# Patient Record
Sex: FEMALE | Race: WHITE | Employment: FULL TIME | URBAN - METROPOLITAN AREA
[De-identification: names, ages, dates, MRNs, and addresses within clinical notes are randomized per-mention and may not be internally consistent; named-entity substitution may affect disease eponyms.]

---

## 2024-02-10 ENCOUNTER — APPOINTMENT (OUTPATIENT)
Dept: GENERAL RADIOLOGY | Age: 24
End: 2024-02-10
Payer: COMMERCIAL

## 2024-02-10 ENCOUNTER — HOSPITAL ENCOUNTER (EMERGENCY)
Age: 24
Discharge: HOME | End: 2024-02-10
Payer: COMMERCIAL

## 2024-02-10 VITALS
OXYGEN SATURATION: 100 % | SYSTOLIC BLOOD PRESSURE: 129 MMHG | TEMPERATURE: 97.2 F | BODY MASS INDEX: 26.26 KG/M2 | HEART RATE: 76 BPM | HEIGHT: 61 IN | DIASTOLIC BLOOD PRESSURE: 88 MMHG | WEIGHT: 139.11 LBS | RESPIRATION RATE: 18 BRPM

## 2024-02-10 DIAGNOSIS — S82.851A FRACTURE OF ANKLE, TRIMALLEOLAR, CLOSED, RIGHT, INITIAL ENCOUNTER: Primary | ICD-10-CM

## 2024-02-10 PROCEDURE — 29515 APPLICATION SHORT LEG SPLINT: CPT

## 2024-02-10 PROCEDURE — 73610 X-RAY EXAM OF ANKLE: CPT

## 2024-02-10 PROCEDURE — 6370000000 HC RX 637 (ALT 250 FOR IP): Performed by: PHYSICIAN ASSISTANT

## 2024-02-10 PROCEDURE — 99283 EMERGENCY DEPT VISIT LOW MDM: CPT

## 2024-02-10 RX ORDER — HYDROCODONE BITARTRATE AND ACETAMINOPHEN 5; 325 MG/1; MG/1
1 TABLET ORAL EVERY 6 HOURS PRN
Qty: 12 TABLET | Refills: 0 | Status: SHIPPED | OUTPATIENT
Start: 2024-02-10 | End: 2024-02-13

## 2024-02-10 RX ORDER — HYDROCODONE BITARTRATE AND ACETAMINOPHEN 5; 325 MG/1; MG/1
1 TABLET ORAL ONCE
Status: COMPLETED | OUTPATIENT
Start: 2024-02-10 | End: 2024-02-10

## 2024-02-10 RX ADMIN — IBUPROFEN 600 MG: 200 TABLET, FILM COATED ORAL at 17:57

## 2024-02-10 RX ADMIN — HYDROCODONE BITARTRATE AND ACETAMINOPHEN 1 TABLET: 5; 325 TABLET ORAL at 18:39

## 2024-02-10 ASSESSMENT — PAIN DESCRIPTION - LOCATION
LOCATION: ANKLE
LOCATION: ANKLE

## 2024-02-10 ASSESSMENT — PAIN SCALES - GENERAL
PAINLEVEL_OUTOF10: 10
PAINLEVEL_OUTOF10: 8
PAINLEVEL_OUTOF10: 8
PAINLEVEL_OUTOF10: 9

## 2024-02-10 ASSESSMENT — LIFESTYLE VARIABLES
HOW MANY STANDARD DRINKS CONTAINING ALCOHOL DO YOU HAVE ON A TYPICAL DAY: PATIENT DOES NOT DRINK
HOW OFTEN DO YOU HAVE A DRINK CONTAINING ALCOHOL: NEVER

## 2024-02-10 ASSESSMENT — PAIN DESCRIPTION - DESCRIPTORS: DESCRIPTORS: THROBBING

## 2024-02-10 ASSESSMENT — PAIN DESCRIPTION - ORIENTATION
ORIENTATION: RIGHT
ORIENTATION: RIGHT

## 2024-02-10 ASSESSMENT — PAIN - FUNCTIONAL ASSESSMENT: PAIN_FUNCTIONAL_ASSESSMENT: 0-10

## 2024-02-10 ASSESSMENT — PAIN DESCRIPTION - PAIN TYPE: TYPE: ACUTE PAIN

## 2024-02-10 NOTE — ED PROVIDER NOTES
**ADVANCED PRACTICE PROVIDER, I HAVE EVALUATED THIS PATIENT**        [facility] EMERGENCY DEPARTMENT  EMERGENCY DEPARTMENT ENCOUNTER      Pt Name: Anne Gillette  MRN:[id number]  Birthdate 2000  Date of evaluation: 2/10/2024  Provider: Niall Gee PA-C  Note Started: 6:36 PM EST 2/10/24        Chief Complaint:    Chief Complaint   Patient presents with    Ankle Pain     Patient was skating at sister's party and fell, twisting and landing on her ankle.          Nursing Notes, Past Medical Hx, Past Surgical Hx, Social Hx, Allergies, and Family Hx were all reviewed and agreed with or any disagreements were addressed in the HPI.    HPI: (Location, Duration, Timing, Severity, Quality, Assoc Sx, Context, Modifying factors)    History From: Patient acute          Chief Complaint of right ankle injury    This is a  23 y.o. female who presents indicating that within the last couple of hours or so before coming in she was skating at a party and had an accidental fall twisting and landing on the right ankle.  She states that it hurts a lot, clicks and pops when she moves it, and is swollen.  No medication taken for this so far.  No radiating pain.  No previous history of fracture to this area according to patient.    PastMedical/Surgical History:  No past medical history on file.  No past surgical history on file.    Medications:  Previous Medications    No medications on file       Review of Systems:  (1 systems needed)  Review of Systems  Positive history as above, no head contusion headache or vision change shortness of breath or chest pain dizziness confusion syncope or near syncope nausea or vomiting.  No knee pain or back pain per usual.  \"Positives and Pertinent negatives as per HPI\"    Physical Exam:  Physical Exam  Vitals and nursing note reviewed.   Constitutional:       Appearance: Normal appearance. She is not ill-appearing, toxic-appearing or diaphoretic.   HENT:      Head: Normocephalic  and atraumatic.      Right Ear: External ear normal.      Left Ear: External ear normal.      Nose: Nose normal.      Mouth/Throat:      Mouth: Mucous membranes are moist.      Comments: Gag reflex intact  Eyes:      General:         Right eye: No discharge.         Left eye: No discharge.      Conjunctiva/sclera: Conjunctivae normal.   Cardiovascular:      Rate and Rhythm: Normal rate.      Pulses: Normal pulses.           Dorsalis pedis pulses are 2+ on the right side and 2+ on the left side.   Pulmonary:      Effort: Pulmonary effort is normal. No respiratory distress.   Musculoskeletal:         General: Swelling and signs of injury present.      Cervical back: Normal range of motion and neck supple. No rigidity or tenderness.        Feet:    Skin:     General: Skin is warm and dry.      Capillary Refill: Capillary refill takes less than 2 seconds.   Neurological:      Mental Status: She is alert and oriented to person, place, and time. Mental status is at baseline.   Psychiatric:         Mood and Affect: Mood normal.         Behavior: Behavior normal.         MEDICAL DECISION MAKING    Vitals:    Vitals:    02/10/24 1542 02/10/24 1837   BP: 129/88    Pulse: 76    Resp: 19 18   Temp: 97.2 °F (36.2 °C)    TempSrc: Oral    SpO2: 100%    Weight: 63.1 kg (139 lb 1.8 oz)    Height: 1.549 m (5' 1\")        LABS:Labs Reviewed - No data to display     Remainder of labs reviewed and were negative at this time or not returned at the time of this note.    RADIOLOGY:   Non-plain film images such as CT, Ultrasound and MRI are read by the radiologist. Niall BOWSER PA-C have directly visualized the radiologic plain film image(s) with the below findings:    3 view right ankle x-ray shows trimalleolar acute fracture with no significant mortise shift or splaying of distal tibia and fibula as interpreted by myself in absence of radiologist.    Interpretation per the Radiologist below, if available at the time of this

## 2024-02-10 NOTE — DISCHARGE INSTRUCTIONS
Home in stable condition to keep the splint clean dry and in place, use over-the-counter medication such as ibuprofen per box directions and then also the prescribed medication for pain as needed, no weightbearing on the right lower extremity, use crutches, ice the area 15 to 20 minutes a few times daily elevate and rest it.  Follow-up with orthopedics on Monday but given the call for further care and treatment.  Return to the ER for any emergency worsening or concern.

## 2024-02-10 NOTE — ED TRIAGE NOTES
Patient was skating at sister's party and fell, twisting and landing on her ankle.  She states it is throbbing and she can feel a popping sensation when she moves it.

## 2024-02-10 NOTE — ED NOTES
.Pt discharged at this time. Discharge instructions and medications reviewed,  Questions were answered. PT verbalized understanding.   Follow up appointments were discussed.

## 2024-02-12 ENCOUNTER — TELEPHONE (OUTPATIENT)
Dept: ORTHOPEDIC SURGERY | Age: 24
End: 2024-02-12

## 2024-02-12 NOTE — TELEPHONE ENCOUNTER
Called and spoke with patient's father Uriel. Explained that patient needs to see Dr. Salinas the foot and ankle surgeon. I will forward the message to the [facility] so Dr. Salinas's staff can work patient into the schedule.

## 2024-02-12 NOTE — TELEPHONE ENCOUNTER
Patient was scheduled for 2/14/2024 at the [facility], [address] [place] [address]., [address], [place] [address].

## 2024-02-12 NOTE — TELEPHONE ENCOUNTER
General Question     Subject: patient is calling to see if she can see Dr. Chao for her Fracture Rt Ankle she would be a new patient was seen in the er over the Weekend  xr done at [facility] she just need a call back.   Patient Anne Gillette  Contact Number: [phone]

## 2024-02-14 ENCOUNTER — ANESTHESIA EVENT (OUTPATIENT)
Dept: OPERATING ROOM | Age: 24
End: 2024-02-14
Payer: COMMERCIAL

## 2024-02-14 ENCOUNTER — TELEPHONE (OUTPATIENT)
Dept: ORTHOPEDIC SURGERY | Age: 24
End: 2024-02-14

## 2024-02-14 ENCOUNTER — APPOINTMENT (OUTPATIENT)
Dept: GENERAL RADIOLOGY | Age: 24
End: 2024-02-14
Attending: ORTHOPAEDIC SURGERY
Payer: COMMERCIAL

## 2024-02-14 ENCOUNTER — ANESTHESIA (OUTPATIENT)
Dept: OPERATING ROOM | Age: 24
End: 2024-02-14
Payer: COMMERCIAL

## 2024-02-14 ENCOUNTER — OFFICE VISIT (OUTPATIENT)
Dept: ORTHOPEDIC SURGERY | Age: 24
End: 2024-02-14
Payer: COMMERCIAL

## 2024-02-14 ENCOUNTER — HOSPITAL ENCOUNTER (OUTPATIENT)
Age: 24
Setting detail: OUTPATIENT SURGERY
Discharge: HOME | End: 2024-02-14
Attending: ORTHOPAEDIC SURGERY | Admitting: ORTHOPAEDIC SURGERY
Payer: COMMERCIAL

## 2024-02-14 VITALS
OXYGEN SATURATION: 93 % | TEMPERATURE: 97.2 F | BODY MASS INDEX: 26.43 KG/M2 | HEIGHT: 61 IN | DIASTOLIC BLOOD PRESSURE: 81 MMHG | SYSTOLIC BLOOD PRESSURE: 117 MMHG | WEIGHT: 140 LBS | HEART RATE: 115 BPM | RESPIRATION RATE: 19 BRPM

## 2024-02-14 VITALS — BODY MASS INDEX: 26.24 KG/M2 | WEIGHT: 139 LBS | HEIGHT: 61 IN

## 2024-02-14 DIAGNOSIS — S82.851A CLOSED TRIMALLEOLAR FRACTURE OF RIGHT ANKLE, INITIAL ENCOUNTER: Primary | ICD-10-CM

## 2024-02-14 PROBLEM — S93.431A SYNDESMOTIC DISRUPTION OF RIGHT ANKLE: Status: ACTIVE | Noted: 2024-02-14

## 2024-02-14 LAB — HCG UR QL: NEGATIVE

## 2024-02-14 PROCEDURE — 7100000001 HC PACU RECOVERY - ADDTL 15 MIN: Performed by: ORTHOPAEDIC SURGERY

## 2024-02-14 PROCEDURE — 99204 OFFICE O/P NEW MOD 45 MIN: CPT | Performed by: ORTHOPAEDIC SURGERY

## 2024-02-14 PROCEDURE — 7100000010 HC PHASE II RECOVERY - FIRST 15 MIN: Performed by: ORTHOPAEDIC SURGERY

## 2024-02-14 PROCEDURE — 2709999900 HC NON-CHARGEABLE SUPPLY: Performed by: ORTHOPAEDIC SURGERY

## 2024-02-14 PROCEDURE — G8484 FLU IMMUNIZE NO ADMIN: HCPCS | Performed by: ORTHOPAEDIC SURGERY

## 2024-02-14 PROCEDURE — 3700000001 HC ADD 15 MINUTES (ANESTHESIA): Performed by: ORTHOPAEDIC SURGERY

## 2024-02-14 PROCEDURE — 2580000003 HC RX 258

## 2024-02-14 PROCEDURE — 2720000010 HC SURG SUPPLY STERILE: Performed by: ORTHOPAEDIC SURGERY

## 2024-02-14 PROCEDURE — 6360000002 HC RX W HCPCS: Performed by: ORTHOPAEDIC SURGERY

## 2024-02-14 PROCEDURE — A4217 STERILE WATER/SALINE, 500 ML: HCPCS | Performed by: ORTHOPAEDIC SURGERY

## 2024-02-14 PROCEDURE — 84703 CHORIONIC GONADOTROPIN ASSAY: CPT

## 2024-02-14 PROCEDURE — 2500000003 HC RX 250 WO HCPCS

## 2024-02-14 PROCEDURE — 2580000003 HC RX 258: Performed by: ORTHOPAEDIC SURGERY

## 2024-02-14 PROCEDURE — 7100000011 HC PHASE II RECOVERY - ADDTL 15 MIN: Performed by: ORTHOPAEDIC SURGERY

## 2024-02-14 PROCEDURE — G8427 DOCREV CUR MEDS BY ELIG CLIN: HCPCS | Performed by: ORTHOPAEDIC SURGERY

## 2024-02-14 PROCEDURE — 2580000003 HC RX 258: Performed by: STUDENT IN AN ORGANIZED HEALTH CARE EDUCATION/TRAINING PROGRAM

## 2024-02-14 PROCEDURE — 73600 X-RAY EXAM OF ANKLE: CPT

## 2024-02-14 PROCEDURE — 6370000000 HC RX 637 (ALT 250 FOR IP): Performed by: STUDENT IN AN ORGANIZED HEALTH CARE EDUCATION/TRAINING PROGRAM

## 2024-02-14 PROCEDURE — G8419 CALC BMI OUT NRM PARAM NOF/U: HCPCS | Performed by: ORTHOPAEDIC SURGERY

## 2024-02-14 PROCEDURE — 6360000002 HC RX W HCPCS

## 2024-02-14 PROCEDURE — 1036F TOBACCO NON-USER: CPT | Performed by: ORTHOPAEDIC SURGERY

## 2024-02-14 PROCEDURE — 3700000000 HC ANESTHESIA ATTENDED CARE: Performed by: ORTHOPAEDIC SURGERY

## 2024-02-14 PROCEDURE — C1713 ANCHOR/SCREW BN/BN,TIS/BN: HCPCS | Performed by: ORTHOPAEDIC SURGERY

## 2024-02-14 PROCEDURE — 3600000005 HC SURGERY LEVEL 5 BASE: Performed by: ORTHOPAEDIC SURGERY

## 2024-02-14 PROCEDURE — 7100000000 HC PACU RECOVERY - FIRST 15 MIN: Performed by: ORTHOPAEDIC SURGERY

## 2024-02-14 PROCEDURE — 3600000015 HC SURGERY LEVEL 5 ADDTL 15MIN: Performed by: ORTHOPAEDIC SURGERY

## 2024-02-14 DEVICE — SCREW BNE L18MM DIA2.7MM HEX HD DIA2.5MM CANC BIODUR 108C: Type: IMPLANTABLE DEVICE | Site: ANKLE | Status: FUNCTIONAL

## 2024-02-14 DEVICE — SCREW BNE L16MM DIA3.5MM HD DIA2.7MM PERIARTC CORT S STL ST: Type: IMPLANTABLE DEVICE | Site: ANKLE | Status: FUNCTIONAL

## 2024-02-14 DEVICE — SCREW BNE L20MM DIA2.7MM HEX HD DIA2.5MM CANC BIODUR 108C: Type: IMPLANTABLE DEVICE | Site: ANKLE | Status: FUNCTIONAL

## 2024-02-14 DEVICE — PLATE BNE L80MM 4 H R LAT DST PERIARTC FIBULAR S STL LOK: Type: IMPLANTABLE DEVICE | Site: ANKLE | Status: FUNCTIONAL

## 2024-02-14 DEVICE — SCREW BNE L14MM DIA2.7MM HEX HD DIA2.5MM CANC BIODUR 108C: Type: IMPLANTABLE DEVICE | Site: ANKLE | Status: FUNCTIONAL

## 2024-02-14 DEVICE — SCREW BNE L48MM DIA3.5MM CORT PERIARTC S STL ST: Type: IMPLANTABLE DEVICE | Site: ANKLE | Status: FUNCTIONAL

## 2024-02-14 DEVICE — SCREW BNE L14MM DIA3.5MM HD DIA2.7MM CORT PERIARTC S STL ST: Type: IMPLANTABLE DEVICE | Site: ANKLE | Status: FUNCTIONAL

## 2024-02-14 DEVICE — SCREW BNE L16MM DIA2.7MM LNG CORT FT ANK S STL ST: Type: IMPLANTABLE DEVICE | Site: ANKLE | Status: FUNCTIONAL

## 2024-02-14 RX ORDER — MEPERIDINE HYDROCHLORIDE 25 MG/ML
12.5 INJECTION INTRAMUSCULAR; INTRAVENOUS; SUBCUTANEOUS EVERY 5 MIN PRN
Status: DISCONTINUED | OUTPATIENT
Start: 2024-02-14 | End: 2024-02-14 | Stop reason: HOSPADM

## 2024-02-14 RX ORDER — ONDANSETRON 2 MG/ML
INJECTION INTRAMUSCULAR; INTRAVENOUS PRN
Status: DISCONTINUED | OUTPATIENT
Start: 2024-02-14 | End: 2024-02-14 | Stop reason: SDUPTHER

## 2024-02-14 RX ORDER — LIDOCAINE HYDROCHLORIDE 20 MG/ML
INJECTION, SOLUTION EPIDURAL; INFILTRATION; INTRACAUDAL; PERINEURAL PRN
Status: DISCONTINUED | OUTPATIENT
Start: 2024-02-14 | End: 2024-02-14 | Stop reason: SDUPTHER

## 2024-02-14 RX ORDER — BUPIVACAINE HYDROCHLORIDE 5 MG/ML
INJECTION, SOLUTION EPIDURAL; INTRACAUDAL
Status: COMPLETED | OUTPATIENT
Start: 2024-02-14 | End: 2024-02-14

## 2024-02-14 RX ORDER — SODIUM CHLORIDE 0.9 % (FLUSH) 0.9 %
5-40 SYRINGE (ML) INJECTION EVERY 12 HOURS SCHEDULED
Status: DISCONTINUED | OUTPATIENT
Start: 2024-02-14 | End: 2024-02-14 | Stop reason: HOSPADM

## 2024-02-14 RX ORDER — ONDANSETRON 2 MG/ML
4 INJECTION INTRAMUSCULAR; INTRAVENOUS
Status: DISCONTINUED | OUTPATIENT
Start: 2024-02-14 | End: 2024-02-14 | Stop reason: HOSPADM

## 2024-02-14 RX ORDER — CEPHALEXIN 500 MG/1
500 CAPSULE ORAL 4 TIMES DAILY
Qty: 20 CAPSULE | Refills: 0 | Status: SHIPPED | OUTPATIENT
Start: 2024-02-14 | End: 2024-02-19

## 2024-02-14 RX ORDER — LORAZEPAM 2 MG/ML
1 INJECTION INTRAMUSCULAR
Status: DISCONTINUED | OUTPATIENT
Start: 2024-02-14 | End: 2024-02-14 | Stop reason: HOSPADM

## 2024-02-14 RX ORDER — IPRATROPIUM BROMIDE AND ALBUTEROL SULFATE 2.5; .5 MG/3ML; MG/3ML
1 SOLUTION RESPIRATORY (INHALATION)
Status: DISCONTINUED | OUTPATIENT
Start: 2024-02-14 | End: 2024-02-14 | Stop reason: HOSPADM

## 2024-02-14 RX ORDER — SODIUM CHLORIDE 9 MG/ML
INJECTION, SOLUTION INTRAVENOUS PRN
Status: DISCONTINUED | OUTPATIENT
Start: 2024-02-14 | End: 2024-02-14 | Stop reason: HOSPADM

## 2024-02-14 RX ORDER — DEXAMETHASONE SODIUM PHOSPHATE 4 MG/ML
INJECTION, SOLUTION INTRA-ARTICULAR; INTRALESIONAL; INTRAMUSCULAR; INTRAVENOUS; SOFT TISSUE PRN
Status: DISCONTINUED | OUTPATIENT
Start: 2024-02-14 | End: 2024-02-14 | Stop reason: SDUPTHER

## 2024-02-14 RX ORDER — MIDAZOLAM HYDROCHLORIDE 1 MG/ML
INJECTION INTRAMUSCULAR; INTRAVENOUS PRN
Status: DISCONTINUED | OUTPATIENT
Start: 2024-02-14 | End: 2024-02-14 | Stop reason: SDUPTHER

## 2024-02-14 RX ORDER — SCOLOPAMINE TRANSDERMAL SYSTEM 1 MG/1
1 PATCH, EXTENDED RELEASE TRANSDERMAL
Status: DISCONTINUED | OUTPATIENT
Start: 2024-02-14 | End: 2024-02-14 | Stop reason: HOSPADM

## 2024-02-14 RX ORDER — METHOCARBAMOL 100 MG/ML
INJECTION, SOLUTION INTRAMUSCULAR; INTRAVENOUS PRN
Status: DISCONTINUED | OUTPATIENT
Start: 2024-02-14 | End: 2024-02-14 | Stop reason: SDUPTHER

## 2024-02-14 RX ORDER — DEXMEDETOMIDINE HYDROCHLORIDE 100 UG/ML
INJECTION, SOLUTION INTRAVENOUS PRN
Status: DISCONTINUED | OUTPATIENT
Start: 2024-02-14 | End: 2024-02-14 | Stop reason: SDUPTHER

## 2024-02-14 RX ORDER — FENTANYL CITRATE 0.05 MG/ML
25 INJECTION, SOLUTION INTRAMUSCULAR; INTRAVENOUS EVERY 5 MIN PRN
Status: DISCONTINUED | OUTPATIENT
Start: 2024-02-14 | End: 2024-02-14 | Stop reason: HOSPADM

## 2024-02-14 RX ORDER — PROCHLORPERAZINE EDISYLATE 5 MG/ML
5 INJECTION INTRAMUSCULAR; INTRAVENOUS
Status: DISCONTINUED | OUTPATIENT
Start: 2024-02-14 | End: 2024-02-14 | Stop reason: HOSPADM

## 2024-02-14 RX ORDER — PROPOFOL 10 MG/ML
INJECTION, EMULSION INTRAVENOUS PRN
Status: DISCONTINUED | OUTPATIENT
Start: 2024-02-14 | End: 2024-02-14 | Stop reason: SDUPTHER

## 2024-02-14 RX ORDER — SODIUM CHLORIDE 0.9 % (FLUSH) 0.9 %
5-40 SYRINGE (ML) INJECTION PRN
Status: DISCONTINUED | OUTPATIENT
Start: 2024-02-14 | End: 2024-02-14 | Stop reason: HOSPADM

## 2024-02-14 RX ORDER — FENTANYL CITRATE 0.05 MG/ML
50 INJECTION, SOLUTION INTRAMUSCULAR; INTRAVENOUS EVERY 5 MIN PRN
Status: DISCONTINUED | OUTPATIENT
Start: 2024-02-14 | End: 2024-02-14 | Stop reason: HOSPADM

## 2024-02-14 RX ORDER — OXYCODONE HYDROCHLORIDE 5 MG/1
5 TABLET ORAL PRN
Status: DISCONTINUED | OUTPATIENT
Start: 2024-02-14 | End: 2024-02-14 | Stop reason: HOSPADM

## 2024-02-14 RX ORDER — FENTANYL CITRATE 50 UG/ML
INJECTION, SOLUTION INTRAMUSCULAR; INTRAVENOUS PRN
Status: DISCONTINUED | OUTPATIENT
Start: 2024-02-14 | End: 2024-02-14 | Stop reason: SDUPTHER

## 2024-02-14 RX ORDER — SODIUM CHLORIDE 9 MG/ML
INJECTION, SOLUTION INTRAVENOUS CONTINUOUS PRN
Status: DISCONTINUED | OUTPATIENT
Start: 2024-02-14 | End: 2024-02-14 | Stop reason: SDUPTHER

## 2024-02-14 RX ADMIN — DEXAMETHASONE SODIUM PHOSPHATE 8 MG: 4 INJECTION, SOLUTION INTRAMUSCULAR; INTRAVENOUS at 12:14

## 2024-02-14 RX ADMIN — HYDROMORPHONE HYDROCHLORIDE 0.5 MG: 1 INJECTION, SOLUTION INTRAMUSCULAR; INTRAVENOUS; SUBCUTANEOUS at 12:27

## 2024-02-14 RX ADMIN — SODIUM CHLORIDE: 9 INJECTION, SOLUTION INTRAVENOUS at 10:32

## 2024-02-14 RX ADMIN — DEXMEDETOMIDINE 6 MCG: 100 INJECTION, SOLUTION INTRAVENOUS at 12:17

## 2024-02-14 RX ADMIN — HYDROMORPHONE HYDROCHLORIDE 0.5 MG: 1 INJECTION, SOLUTION INTRAMUSCULAR; INTRAVENOUS; SUBCUTANEOUS at 12:33

## 2024-02-14 RX ADMIN — DEXMEDETOMIDINE 6 MCG: 100 INJECTION, SOLUTION INTRAVENOUS at 12:22

## 2024-02-14 RX ADMIN — SODIUM CHLORIDE 2000 MG: 900 INJECTION INTRAVENOUS at 12:14

## 2024-02-14 RX ADMIN — ONDANSETRON 4 MG: 2 INJECTION INTRAMUSCULAR; INTRAVENOUS at 12:19

## 2024-02-14 RX ADMIN — DEXMEDETOMIDINE 8 MCG: 100 INJECTION, SOLUTION INTRAVENOUS at 12:25

## 2024-02-14 RX ADMIN — MIDAZOLAM 1 MG: 1 INJECTION INTRAMUSCULAR; INTRAVENOUS at 12:05

## 2024-02-14 RX ADMIN — HYDROMORPHONE HYDROCHLORIDE 0.5 MG: 1 INJECTION, SOLUTION INTRAMUSCULAR; INTRAVENOUS; SUBCUTANEOUS at 12:22

## 2024-02-14 RX ADMIN — METHOCARBAMOL 500 MG: 100 INJECTION INTRAMUSCULAR; INTRAVENOUS at 12:24

## 2024-02-14 RX ADMIN — LIDOCAINE HYDROCHLORIDE 60 MG: 20 INJECTION, SOLUTION EPIDURAL; INFILTRATION; INTRACAUDAL; PERINEURAL at 12:08

## 2024-02-14 RX ADMIN — SODIUM CHLORIDE: 9 INJECTION, SOLUTION INTRAVENOUS at 12:03

## 2024-02-14 RX ADMIN — PROPOFOL 200 MG: 10 INJECTION, EMULSION INTRAVENOUS at 12:09

## 2024-02-14 RX ADMIN — FENTANYL CITRATE 100 MCG: 50 INJECTION INTRAMUSCULAR; INTRAVENOUS at 12:07

## 2024-02-14 NOTE — BRIEF OP NOTE
Brief Postoperative Note      Patient: Anne Gillette  YOB: 2000  MRN: [id number]    Date of Procedure: 2/14/2024    Pre-Op Diagnosis Codes:     * Closed fracture of right ankle, initial encounter [S82.891A]    Post-Op Diagnosis: Same       Procedure(s):  OPEN REDUCTION INTERNAL FIXATION RIGHT ANKLE TRIMALLEOLAR FRACTURE WITH SYNDESMOSIS REPAIR.    Surgeon(s):  Alan Salinas MD    Assistant: VALERIE Garcia  Surgical Assistant: Melvin Paige    Anesthesia: General    Estimated Blood Loss (mL): Minimal    Complications: None    Specimens:   * No specimens in log *    Implants:  Implant Name Type Inv. Item Serial No.  Lot No. LRB No. Used Action   PLATE BNE L80MM 4 H R LAT DST PERIARTC FIBULAR S STL PAU - [id number]  PLATE BNE L80MM 4 H R LAT DST PERIARTC FIBULAR S STL PAU  [employer]WD  Right 1 Implanted   SCREW BNE L16MM DIA2.7MM LNG DELISA FT ANK S STL ST - [id number]  SCREW BNE L16MM DIA2.7MM LNG DELISA FT ANK S STL ST  [employer]WD  Right 1 Implanted   SCREW BNE L16MM DIA3.5MM HD DIA2.7MM PERIARTC DELISA S STL ST - [id number]  SCREW BNE L16MM DIA3.5MM HD DIA2.7MM PERIARTC DELISA S STL ST  [employer]WD  Right 1 Implanted   SCREW BNE L18MM DIA2.7MM HEX HD DIA2.5MM CANC BIODUR 108C - [id number]  SCREW BNE L18MM DIA2.7MM HEX HD DIA2.5MM CANC BIODUR 108C  [employer]WD  Right 3 Implanted   SCREW BNE L16MM DIA2.7MM HEX HD DIA2.5MM CANC BIODUR 108C - [id number]  SCREW BNE L16MM DIA2.7MM HEX HD DIA2.5MM CANC BIODUR 108C  [employer]WD  Right 3 Implanted   SCREW BNE L14MM DIA2.7MM HEX HD DIA2.5MM CANC BIODUR 108C - [id number]  SCREW BNE L14MM DIA2.7MM HEX HD DIA2.5MM CANC BIODUR 108C  [employer]WD  Right 1 Implanted   SCREW BNE L14MM DIA3.5MM HD DIA2.7MM DELISA PERIARTC S STL ST - [id number]  SCREW BNE L14MM DIA3.5MM HD DIA2.7MM DELISA PERIARTC S STL ST  [employer]-WD  Right 1 Implanted   SCREW BNE L48MM DIA3.5MM DELISA PERIARTC S STL ST -

## 2024-02-14 NOTE — TELEPHONE ENCOUNTER
Auth: NPR  Date: 02/14/24  Reference # None  Spoke with: Online  Type of SX: Outpatient  Location: [facility]  CPT: 72491   DX: S82.851A  SX area: Rt ankle  Insurance: [facility]

## 2024-02-14 NOTE — PROGRESS NOTES
Pt is alert and oriented and denies pain at this time, vital signs stable on room air. Discharged instructions given to Pt and dad, all questions answered. Pt discharged to home with dad to transport.

## 2024-02-14 NOTE — H&P
Preoperative H&P Update    The patient's History and Physical in the medical record from 2/14/2024 was reviewed by me today.    No past medical history on file.  No past surgical history on file.  No current facility-administered medications on file prior to encounter.     Current Outpatient Medications on File Prior to Encounter   Medication Sig Dispense Refill    cephALEXin (KEFLEX) 500 MG capsule Take 1 capsule by mouth 4 times daily for 5 days (Patient not taking: Reported on 2/14/2024) 20 capsule 0       Allergies   Allergen Reactions    Bactrim [Sulfamethoxazole-Trimethoprim] Hives      I reviewed the HPI, medications, allergies, reason for surgery, diagnosis and treatment plan and there has been no change.    The patient was evaluated by me today. Physical exam findings for this update include:    Vitals:    02/14/24 1012   BP: 118/78   Pulse: (!) 101   Resp: 15   Temp: 99.6 °F (37.6 °C)   SpO2: 98%     Airway is intact  Chest: chest clear, no wheezing, rales, normal symmetric air entry, no tachypnea, retractions or cyanosis  Heart: regular rate and rhythm ; heart sounds normal  Findings on exam of the body region where surgery is to be performed include:  Right ankle pain / trimalleolar fracture.    Electronically signed by Alan Salinas MD on 2/14/2024 at 11:39 AM

## 2024-02-14 NOTE — ANESTHESIA PRE PROCEDURE
Department of Anesthesiology  Preprocedure Note       Name:  Anne Gillette   Age:  23 y.o.  :  2000                                          MRN:  [id number]         Date:  2024      Surgeon: Surgeon(s):  Alan Salinas MD    Procedure: Procedure(s):  OPEN REDUCTION INTERNAL FIXATION RIGHT ANKLE FRACTURE    Medications prior to admission:   Prior to Admission medications    Medication Sig Start Date End Date Taking? Authorizing Provider   cephALEXin (KEFLEX) 500 MG capsule Take 1 capsule by mouth 4 times daily for 5 days 24  Alan Salinas MD       Current medications:    Current Facility-Administered Medications   Medication Dose Route Frequency Provider Last Rate Last Admin    sodium chloride flush 0.9 % injection 5-40 mL  5-40 mL IntraVENous 2 times per day Noah Gee MD        sodium chloride flush 0.9 % injection 5-40 mL  5-40 mL IntraVENous PRN Noah Gee MD        0.9 % sodium chloride infusion   IntraVENous PRN Noah Gee MD        ceFAZolin (ANCEF) 2,000 mg in sodium chloride 0.9 % 50 mL IVPB (mini-bag)  2,000 mg IntraVENous Once Alan Salinas MD           Allergies:    Allergies   Allergen Reactions    Bactrim [Sulfamethoxazole-Trimethoprim] Hives       Problem List:    Patient Active Problem List   Diagnosis Code    Closed trimalleolar fracture of right ankle S82.851A       Past Medical History:  No past medical history on file.    Past Surgical History:  No past surgical history on file.    Social History:    Social History     Tobacco Use    Smoking status: Never    Smokeless tobacco: Never   Substance Use Topics    Alcohol use: Not Currently     Comment: special occasions                                Counseling given: Not Answered      Vital Signs (Current): There were no vitals filed for this visit.                                           BP Readings from Last 3 Encounters:   02/10/24 129/88       NPO Status:

## 2024-02-14 NOTE — ANESTHESIA POSTPROCEDURE EVALUATION
Department of Anesthesiology  Postprocedure Note    Patient: Anne Gillette  MRN: [id number]  YOB: 2000  Date of evaluation: 2/14/2024    Procedure Summary       Date: 02/14/24 Room / Location: [address]    Anesthesia Start: 1205 Anesthesia Stop: 1317    Procedure: OPEN REDUCTION INTERNAL FIXATION RIGHT ANKLE FRACTURE (Right: Ankle) Diagnosis:       Closed fracture of right ankle, initial encounter      (Closed fracture of right ankle, initial encounter [S82.891A])    Surgeons: Alan Salinas MD Responsible Provider: Annabelle Quinn MD    Anesthesia Type: general ASA Status: 1            Anesthesia Type: No value filed.    Kun Phase I: Kun Score: 10    Kun Phase II: Kun Score: 10    Anesthesia Post Evaluation    Patient location during evaluation: PACU  Patient participation: complete - patient participated  Level of consciousness: awake and alert  Airway patency: patent  Nausea & Vomiting: no nausea and no vomiting  Cardiovascular status: hemodynamically stable  Respiratory status: acceptable  Hydration status: stable  Pain management: adequate    No notable events documented.

## 2024-02-14 NOTE — PROGRESS NOTES
CHIEF COMPLAINT: Right ankle pain / trimalleolar fracture.    DATE OF INJURY: 2/10/2024    HISTORY:  Ms. Gillette 23 y.o.  female presents today for the first visit for evaluation of a right ankle injury which occurred when she was at her sister's skating party and fell.  She was first seen and evaluated in [facility], where she was x-rayed, splinted and asked to f/u with Orthopedics. She is complaining of medial and lateral ankle pain and swelling 2/10. This is better with elevation and worse with bearing any wt. The pain is sharp and not radiating. No numbness or tingling sensation. Alleviating factors: elevation and rest. No other complaint.  Denies smoking.  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    No past medical history on file.    No past surgical history on file.    Social History     Socioeconomic History    Marital status: Single     Spouse name: Not on file    Number of children: Not on file    Years of education: Not on file    Highest education level: Not on file   Occupational History    Not on file   Tobacco Use    Smoking status: Never    Smokeless tobacco: Never   Substance and Sexual Activity    Alcohol use: Not Currently     Comment: special occasions    Drug use: Never    Sexual activity: Defer   Other Topics Concern    Not on file   Social History Narrative    Not on file     Social Determinants of Health     Financial Resource Strain: Not on file   Food Insecurity: Not on file   Transportation Needs: Not on file   Physical Activity: Not on file   Stress: Not on file   Social Connections: Not on file   Intimate Partner Violence: Not on file   Housing Stability: Not on file       No family history on file.    No current outpatient medications on file prior to visit.     No current facility-administered medications on file prior to visit.       Pertinent items are noted in HPI  Review of systems reviewed from Patient History Form and available in the

## 2024-02-14 NOTE — DISCHARGE INSTRUCTIONS
Post op instruction:  1- D/C home  2- Dx Right ankle pain / trimalleolar fracture.  3- NWB right ankle  4- Elevation surgical site, with ice  5- Keep splint dry and clean  6- F/U in 2 weeks.  7- For DVT prophylaxis- Aspirin 325 mg daily     Alan Salinas MD, 2/14/2024

## 2024-02-15 NOTE — OP NOTE
[facility]           [address] [place] [address]                                OPERATIVE REPORT    PATIENT NAME: KRISTOPHER KLEIN                      :        2000  MED REC NO:   [id number]                          ROOM:  ACCOUNT NO:   [id number]                           ADMIT DATE: 2024  PROVIDER:     Alan Salinas MD    DATE OF PROCEDURE:  2024    PREOPERATIVE DIAGNOSES:  1.  Right ankle trimalleolar displaced fracture.  2.  Right ankle distal tibiofibular fibular syndesmosis disruption.    POSTOPERATIVE DIAGNOSES:  1.  Right ankle trimalleolar displaced fracture.  2.  Right ankle distal tibiofibular fibular syndesmosis disruption.    OPERATION PERFORMED:  1.  Open treatment of right ankle trimalleolar fracture open reduction  internal fixation without posterior lip.  2.  Open treatment of right ankle distal tibiofibular syndesmosis  disruption with syndesmosis screw.    SURGEON:  Alan Salinas MD    ASSISTANT:  Yee Garcia CNP    ANESTHESIA:  General anesthesia.    ESTIMATED BLOOD LOSS:  Minimal.    COMPLICATIONS:  None.    TOURNIQUET:  Right upper thigh 300 mmHg.    IMPLANT USED:  1.  Ugo distal fibula locking plate with two proximal screws and five  distal 2.7 locking screws.  2.  Ugo 3.5 cortical screw x1 for syndesmosis repair.    INDICATIONS:  This is a 23-year-old white female who sustained injury to  her right ankle when she was at her sister's skating party and fell  sustaining twisting injury to her right ankle.  She was brought to [facility] and was found to have a trimalleolar ankle fracture.  She was  splinted and asked to follow up with Orthopedics.  All risks, benefits,  and alternatives were discussed with the patient.  She agreed to proceed  for surgical fixation.    OPERATIVE PROCEDURE:  The patient's right ankle was marked.  She  received 2 gm Ancef IV preoperatively.  The patient was then brought

## 2024-02-16 RX ORDER — CEPHALEXIN 500 MG/1
500 CAPSULE ORAL 4 TIMES DAILY
Qty: 40 CAPSULE | Refills: 0 | Status: SHIPPED | OUTPATIENT
Start: 2024-02-16 | End: 2024-02-26

## 2024-02-28 ENCOUNTER — OFFICE VISIT (OUTPATIENT)
Dept: ORTHOPEDIC SURGERY | Age: 24
End: 2024-02-28
Payer: COMMERCIAL

## 2024-02-28 VITALS — HEIGHT: 61 IN | BODY MASS INDEX: 26.43 KG/M2 | WEIGHT: 140 LBS

## 2024-02-28 DIAGNOSIS — S93.431A SYNDESMOTIC DISRUPTION OF RIGHT ANKLE, INITIAL ENCOUNTER: ICD-10-CM

## 2024-02-28 DIAGNOSIS — S82.851A CLOSED TRIMALLEOLAR FRACTURE OF RIGHT ANKLE, INITIAL ENCOUNTER: Primary | ICD-10-CM

## 2024-02-28 PROCEDURE — 99024 POSTOP FOLLOW-UP VISIT: CPT | Performed by: NURSE PRACTITIONER

## 2024-02-28 PROCEDURE — L4361 PNEUMA/VAC WALK BOOT PRE OTS: HCPCS | Performed by: NURSE PRACTITIONER

## 2024-02-28 PROCEDURE — APPNB15 APP NON BILLABLE TIME 0-15 MINS: Performed by: NURSE PRACTITIONER

## 2024-02-28 NOTE — PROGRESS NOTES
DIAGNOSIS:    1-Right ankle trimalleolar displaced fracture, status post ORIF.  2-Right ankle distal tibiofibular syndesmosis disruption, s/p ORIF syndesmosis screw    DATE OF SURGERY:  2/14/2024.    HISTORY OF PRESENT ILLNESS:  Ms. Gillette 23 y.o.  female who came in today for 2 weeks postoperative visit.  The patient denies any significant pain in the right ankle. Rates pain a 0-1/10 VAS mild, aching, intermittent and are improving. Aggravating factors movement. Alleviating factors elevation and rest. She has been in a splint, and non WB.  No numbness or tingling sensation. No fever or Chills.     PHYSICAL EXAMINATION:  The incision healing well.  No signs of any erythema or drainage, minimal swelling. She has no pain with the active or passive range of motion of the right ankle, but decrease ROM.  She has intact sensation distally, and she is neurovascularly intact.    IMAGING:  Three views right ankle taken today in the office showed anatomic alignment of the fracture, plate and screws in good position, no loosening. Ankle mortise is well centered. Syndesmosis screw intact.     IMPRESSION:  2 weeks out from   1-Right ankle trimalleolar displaced fracture, status post ORIF.  2-Right ankle distal tibiofibular syndesmosis disruption, s/p ORIF syndesmosis screw      PLAN: She placed in a boot, and TTWB for 4 weeks then gradual WBAT in the boot. I have told the patient to work on ROM. ASA for DVT prophylaxis.  The patient will come back for a follow up in 6 weeks.  At that time, we will take 3 views of the right ankle standing.She will likely need a staged procedure for syndesmosis screw removal at 4-5 months.         Procedures    [facility] / [employer] Tall Walking Boot     Patient was prescribed a [employer] Tall Reba Walking Boot.  The right ankle will require stabilization / immobilization from this semi-rigid / rigid orthosis to improve their function.  The orthosis will assist in protecting the affected area,

## 2024-02-29 ENCOUNTER — TELEPHONE (OUTPATIENT)
Dept: ORTHOPEDIC SURGERY | Age: 24
End: 2024-02-29

## 2024-04-10 ENCOUNTER — TELEPHONE (OUTPATIENT)
Dept: ORTHOPEDIC SURGERY | Age: 24
End: 2024-04-10

## 2024-04-10 ENCOUNTER — OFFICE VISIT (OUTPATIENT)
Dept: ORTHOPEDIC SURGERY | Age: 24
End: 2024-04-10

## 2024-04-10 DIAGNOSIS — S93.431D SYNDESMOTIC DISRUPTION OF RIGHT ANKLE, SUBSEQUENT ENCOUNTER: ICD-10-CM

## 2024-04-10 DIAGNOSIS — S82.851A CLOSED TRIMALLEOLAR FRACTURE OF RIGHT ANKLE, INITIAL ENCOUNTER: Primary | ICD-10-CM

## 2024-04-10 PROCEDURE — 99024 POSTOP FOLLOW-UP VISIT: CPT | Performed by: NURSE PRACTITIONER

## 2024-04-10 PROCEDURE — APPNB15 APP NON BILLABLE TIME 0-15 MINS: Performed by: NURSE PRACTITIONER

## 2024-04-10 NOTE — PROGRESS NOTES
DIAGNOSIS:    1-Right ankle trimalleolar displaced fracture, status post ORIF.  2-Right ankle distal tibiofibular syndesmosis disruption, s/p ORIF syndesmosis screw    DATE OF SURGERY:  2/14/2024.    HISTORY OF PRESENT ILLNESS:  Ms. Gileltte 23 y.o.  female who came in today for 8 weeks postoperative visit.  The patient denies any significant pain in the right ankle. Rates pain a 0/10 VAS and doing very well. She has been in a boot, and TT WB.  No numbness or tingling sensation. No fever or Chills.  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  Denies smoking.    PHYSICAL EXAMINATION:  The incision healing well.  No signs of any erythema or drainage, minimal swelling. She has no pain with the active or passive range of motion of the right ankle, but decrease ROM.  She has intact sensation distally, and she is neurovascularly intact.    IMAGING:  Three views right ankle taken today in the office showed anatomic alignment of the fracture, plate and screws in good position, no loosening. Ankle mortise is well centered. Syndesmosis screw intact.     IMPRESSION:  8 weeks out from   1-Right ankle trimalleolar displaced fracture, status post ORIF.  2-Right ankle distal tibiofibular syndesmosis disruption, s/p ORIF syndesmosis screw      PLAN: She will be WBAT in the boot, and start aggressive ROM and peroneal strengthening exercise. Off the boot in 1 weeks. No heavy impact activities. The patient will come back for a follow up in 6 weeks.  At that time, we will take 3 views of the right ankle standing. She will likely need a staged procedure for syndesmosis screw removal at 4-5 months.         Yee Garcia, APRN - CNP

## 2024-05-22 ENCOUNTER — OFFICE VISIT (OUTPATIENT)
Dept: ORTHOPEDIC SURGERY | Age: 24
End: 2024-05-22
Payer: COMMERCIAL

## 2024-05-22 VITALS — WEIGHT: 140 LBS | BODY MASS INDEX: 26.43 KG/M2 | HEIGHT: 61 IN

## 2024-05-22 DIAGNOSIS — S82.851A CLOSED TRIMALLEOLAR FRACTURE OF RIGHT ANKLE, INITIAL ENCOUNTER: Primary | ICD-10-CM

## 2024-05-22 DIAGNOSIS — S93.431D SYNDESMOTIC DISRUPTION OF RIGHT ANKLE, SUBSEQUENT ENCOUNTER: ICD-10-CM

## 2024-05-22 PROCEDURE — G8419 CALC BMI OUT NRM PARAM NOF/U: HCPCS | Performed by: ORTHOPAEDIC SURGERY

## 2024-05-22 PROCEDURE — 1036F TOBACCO NON-USER: CPT | Performed by: ORTHOPAEDIC SURGERY

## 2024-05-22 PROCEDURE — 99214 OFFICE O/P EST MOD 30 MIN: CPT | Performed by: ORTHOPAEDIC SURGERY

## 2024-05-22 PROCEDURE — G8427 DOCREV CUR MEDS BY ELIG CLIN: HCPCS | Performed by: ORTHOPAEDIC SURGERY

## 2024-05-22 NOTE — PROGRESS NOTES
DIAGNOSIS:    1-Right ankle trimalleolar displaced fracture, status post ORIF.  2-Right ankle distal tibiofibular syndesmosis disruption, s/p ORIF syndesmosis screw    DATE OF SURGERY:  2/14/2024.    HISTORY OF PRESENT ILLNESS: Anne Gillette 23 y.o. female who came in today for 3 months postoperative visit.  The patient denies any significant pain in the right ankle. Rates pain a 0/10 VAS and doing very well. She has been WB.  No numbness or tingling sensation. Does have increased pain after work day. No fever or Chills.  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 Still feels stiff.   Denies smoking.    No past medical history on file.    Past Surgical History:   Procedure Laterality Date    ANKLE FRACTURE SURGERY Right 2/14/2024    OPEN REDUCTION INTERNAL FIXATION RIGHT ANKLE FRACTURE performed by Alan Salinas MD at [facility] OR       Social History     Socioeconomic History    Marital status: Single     Spouse name: Not on file    Number of children: Not on file    Years of education: Not on file    Highest education level: Not on file   Occupational History    Not on file   Tobacco Use    Smoking status: Never    Smokeless tobacco: Never   Substance and Sexual Activity    Alcohol use: Not Currently     Comment: special occasions    Drug use: Never    Sexual activity: Defer   Other Topics Concern    Not on file   Social History Narrative    Not on file     Social Determinants of Health     Financial Resource Strain: Not on file   Food Insecurity: Not on file   Transportation Needs: Not on file   Physical Activity: Not on file   Stress: Not on file   Social Connections: Not on file   Intimate Partner Violence: Not on file   Housing Stability: Not on file       No family history on file.    No current outpatient medications on file prior to visit.     No current facility-administered medications on file prior to visit.       Pertinent items are noted in HPI  Review of systems reviewed from

## 2024-05-24 ENCOUNTER — TELEPHONE (OUTPATIENT)
Dept: ORTHOPEDIC SURGERY | Age: 24
End: 2024-05-24

## 2024-05-24 ENCOUNTER — PREP FOR PROCEDURE (OUTPATIENT)
Dept: ORTHOPEDIC SURGERY | Age: 24
End: 2024-05-24

## 2024-05-24 DIAGNOSIS — S93.431A ANKLE SYNDESMOSIS DISRUPTION, RIGHT, INITIAL ENCOUNTER: ICD-10-CM

## 2024-05-24 RX ORDER — NORETHINDRONE ACETATE AND ETHINYL ESTRADIOL, AND FERROUS FUMARATE 1MG-20(24)
1 KIT ORAL DAILY
COMMUNITY
Start: 2023-07-10

## 2024-05-24 NOTE — TELEPHONE ENCOUNTER
Surgery and/or Procedure Scheduling     Contact Name: Anne Gillette   Surgical/Procedure Request: Sx on Rt Ankle  Patient Contact Number: [phone]     Patient is calling to get schedule for surgery. Please Advise.

## 2024-05-31 ENCOUNTER — ANESTHESIA EVENT (OUTPATIENT)
Dept: OPERATING ROOM | Age: 24
End: 2024-05-31
Payer: COMMERCIAL

## 2024-06-03 ENCOUNTER — APPOINTMENT (OUTPATIENT)
Dept: GENERAL RADIOLOGY | Age: 24
End: 2024-06-03
Attending: ORTHOPAEDIC SURGERY
Payer: COMMERCIAL

## 2024-06-03 ENCOUNTER — HOSPITAL ENCOUNTER (OUTPATIENT)
Age: 24
Setting detail: OUTPATIENT SURGERY
Discharge: HOME | End: 2024-06-03
Attending: ORTHOPAEDIC SURGERY | Admitting: ORTHOPAEDIC SURGERY
Payer: COMMERCIAL

## 2024-06-03 ENCOUNTER — ANESTHESIA (OUTPATIENT)
Dept: OPERATING ROOM | Age: 24
End: 2024-06-03
Payer: COMMERCIAL

## 2024-06-03 VITALS
OXYGEN SATURATION: 98 % | HEIGHT: 61 IN | SYSTOLIC BLOOD PRESSURE: 118 MMHG | RESPIRATION RATE: 16 BRPM | TEMPERATURE: 98.1 F | HEART RATE: 86 BPM | DIASTOLIC BLOOD PRESSURE: 76 MMHG | WEIGHT: 155 LBS | BODY MASS INDEX: 29.27 KG/M2

## 2024-06-03 PROBLEM — Z96.9 RETAINED ORTHOPEDIC HARDWARE: Status: ACTIVE | Noted: 2024-06-03

## 2024-06-03 LAB — HCG UR QL: NEGATIVE

## 2024-06-03 PROCEDURE — 2709999900 HC NON-CHARGEABLE SUPPLY: Performed by: ORTHOPAEDIC SURGERY

## 2024-06-03 PROCEDURE — 84703 CHORIONIC GONADOTROPIN ASSAY: CPT

## 2024-06-03 PROCEDURE — 3700000000 HC ANESTHESIA ATTENDED CARE: Performed by: ORTHOPAEDIC SURGERY

## 2024-06-03 PROCEDURE — 2580000003 HC RX 258: Performed by: ORTHOPAEDIC SURGERY

## 2024-06-03 PROCEDURE — 6360000002 HC RX W HCPCS: Performed by: ORTHOPAEDIC SURGERY

## 2024-06-03 PROCEDURE — 3600000004 HC SURGERY LEVEL 4 BASE: Performed by: ORTHOPAEDIC SURGERY

## 2024-06-03 PROCEDURE — 7100000000 HC PACU RECOVERY - FIRST 15 MIN: Performed by: ORTHOPAEDIC SURGERY

## 2024-06-03 PROCEDURE — 6360000002 HC RX W HCPCS

## 2024-06-03 PROCEDURE — 7100000010 HC PHASE II RECOVERY - FIRST 15 MIN: Performed by: ORTHOPAEDIC SURGERY

## 2024-06-03 PROCEDURE — 7100000001 HC PACU RECOVERY - ADDTL 15 MIN: Performed by: ORTHOPAEDIC SURGERY

## 2024-06-03 PROCEDURE — 2500000003 HC RX 250 WO HCPCS

## 2024-06-03 PROCEDURE — 3700000001 HC ADD 15 MINUTES (ANESTHESIA): Performed by: ORTHOPAEDIC SURGERY

## 2024-06-03 PROCEDURE — 73600 X-RAY EXAM OF ANKLE: CPT

## 2024-06-03 PROCEDURE — 20680 REMOVAL OF IMPLANT DEEP: CPT | Performed by: ORTHOPAEDIC SURGERY

## 2024-06-03 PROCEDURE — A4217 STERILE WATER/SALINE, 500 ML: HCPCS | Performed by: ORTHOPAEDIC SURGERY

## 2024-06-03 PROCEDURE — 7100000011 HC PHASE II RECOVERY - ADDTL 15 MIN: Performed by: ORTHOPAEDIC SURGERY

## 2024-06-03 PROCEDURE — 3600000014 HC SURGERY LEVEL 4 ADDTL 15MIN: Performed by: ORTHOPAEDIC SURGERY

## 2024-06-03 PROCEDURE — 2580000003 HC RX 258: Performed by: ANESTHESIOLOGY

## 2024-06-03 RX ORDER — BUPIVACAINE HYDROCHLORIDE 5 MG/ML
INJECTION, SOLUTION EPIDURAL; INTRACAUDAL
Status: COMPLETED | OUTPATIENT
Start: 2024-06-03 | End: 2024-06-03

## 2024-06-03 RX ORDER — FENTANYL CITRATE 50 UG/ML
INJECTION, SOLUTION INTRAMUSCULAR; INTRAVENOUS PRN
Status: DISCONTINUED | OUTPATIENT
Start: 2024-06-03 | End: 2024-06-03 | Stop reason: SDUPTHER

## 2024-06-03 RX ORDER — SODIUM CHLORIDE 9 MG/ML
INJECTION, SOLUTION INTRAVENOUS PRN
Status: DISCONTINUED | OUTPATIENT
Start: 2024-06-03 | End: 2024-06-03 | Stop reason: HOSPADM

## 2024-06-03 RX ORDER — ONDANSETRON 2 MG/ML
INJECTION INTRAMUSCULAR; INTRAVENOUS PRN
Status: DISCONTINUED | OUTPATIENT
Start: 2024-06-03 | End: 2024-06-03 | Stop reason: SDUPTHER

## 2024-06-03 RX ORDER — SODIUM CHLORIDE 0.9 % (FLUSH) 0.9 %
5-40 SYRINGE (ML) INJECTION PRN
Status: DISCONTINUED | OUTPATIENT
Start: 2024-06-03 | End: 2024-06-03 | Stop reason: HOSPADM

## 2024-06-03 RX ORDER — DEXAMETHASONE SODIUM PHOSPHATE 4 MG/ML
INJECTION, SOLUTION INTRA-ARTICULAR; INTRALESIONAL; INTRAMUSCULAR; INTRAVENOUS; SOFT TISSUE PRN
Status: DISCONTINUED | OUTPATIENT
Start: 2024-06-03 | End: 2024-06-03 | Stop reason: SDUPTHER

## 2024-06-03 RX ORDER — KETOROLAC TROMETHAMINE 30 MG/ML
INJECTION, SOLUTION INTRAMUSCULAR; INTRAVENOUS PRN
Status: DISCONTINUED | OUTPATIENT
Start: 2024-06-03 | End: 2024-06-03 | Stop reason: SDUPTHER

## 2024-06-03 RX ORDER — SODIUM CHLORIDE 0.9 % (FLUSH) 0.9 %
5-40 SYRINGE (ML) INJECTION EVERY 12 HOURS SCHEDULED
Status: DISCONTINUED | OUTPATIENT
Start: 2024-06-03 | End: 2024-06-03 | Stop reason: HOSPADM

## 2024-06-03 RX ORDER — ONDANSETRON 2 MG/ML
4 INJECTION INTRAMUSCULAR; INTRAVENOUS
Status: DISCONTINUED | OUTPATIENT
Start: 2024-06-03 | End: 2024-06-03 | Stop reason: HOSPADM

## 2024-06-03 RX ORDER — MAGNESIUM HYDROXIDE 1200 MG/15ML
LIQUID ORAL CONTINUOUS PRN
Status: COMPLETED | OUTPATIENT
Start: 2024-06-03 | End: 2024-06-03

## 2024-06-03 RX ORDER — CEPHALEXIN 500 MG/1
500 CAPSULE ORAL 4 TIMES DAILY
Qty: 12 CAPSULE | Refills: 0 | Status: SHIPPED | OUTPATIENT
Start: 2024-06-03 | End: 2024-06-06

## 2024-06-03 RX ORDER — PROPOFOL 10 MG/ML
INJECTION, EMULSION INTRAVENOUS PRN
Status: DISCONTINUED | OUTPATIENT
Start: 2024-06-03 | End: 2024-06-03 | Stop reason: SDUPTHER

## 2024-06-03 RX ORDER — FENTANYL CITRATE 0.05 MG/ML
25 INJECTION, SOLUTION INTRAMUSCULAR; INTRAVENOUS EVERY 5 MIN PRN
Status: DISCONTINUED | OUTPATIENT
Start: 2024-06-03 | End: 2024-06-03 | Stop reason: HOSPADM

## 2024-06-03 RX ORDER — LIDOCAINE HYDROCHLORIDE 20 MG/ML
INJECTION, SOLUTION EPIDURAL; INFILTRATION; INTRACAUDAL; PERINEURAL PRN
Status: DISCONTINUED | OUTPATIENT
Start: 2024-06-03 | End: 2024-06-03 | Stop reason: SDUPTHER

## 2024-06-03 RX ORDER — NALOXONE HYDROCHLORIDE 0.4 MG/ML
INJECTION, SOLUTION INTRAMUSCULAR; INTRAVENOUS; SUBCUTANEOUS PRN
Status: DISCONTINUED | OUTPATIENT
Start: 2024-06-03 | End: 2024-06-03 | Stop reason: HOSPADM

## 2024-06-03 RX ORDER — MIDAZOLAM HYDROCHLORIDE 1 MG/ML
INJECTION INTRAMUSCULAR; INTRAVENOUS PRN
Status: DISCONTINUED | OUTPATIENT
Start: 2024-06-03 | End: 2024-06-03 | Stop reason: SDUPTHER

## 2024-06-03 RX ADMIN — FENTANYL CITRATE 25 MCG: 50 INJECTION INTRAMUSCULAR; INTRAVENOUS at 07:39

## 2024-06-03 RX ADMIN — LIDOCAINE HYDROCHLORIDE 100 MG: 20 INJECTION, SOLUTION EPIDURAL; INFILTRATION; INTRACAUDAL; PERINEURAL at 07:29

## 2024-06-03 RX ADMIN — PROPOFOL 200 MG: 10 INJECTION, EMULSION INTRAVENOUS at 07:29

## 2024-06-03 RX ADMIN — DEXAMETHASONE SODIUM PHOSPHATE 10 MG: 4 INJECTION, SOLUTION INTRAMUSCULAR; INTRAVENOUS at 07:32

## 2024-06-03 RX ADMIN — FENTANYL CITRATE 25 MCG: 50 INJECTION INTRAMUSCULAR; INTRAVENOUS at 07:26

## 2024-06-03 RX ADMIN — SODIUM CHLORIDE: 9 INJECTION, SOLUTION INTRAVENOUS at 07:26

## 2024-06-03 RX ADMIN — KETOROLAC TROMETHAMINE 30 MG: 30 INJECTION, SOLUTION INTRAMUSCULAR at 07:32

## 2024-06-03 RX ADMIN — SODIUM CHLORIDE 2000 MG: 900 INJECTION INTRAVENOUS at 07:30

## 2024-06-03 RX ADMIN — MIDAZOLAM 1 MG: 1 INJECTION INTRAMUSCULAR; INTRAVENOUS at 07:26

## 2024-06-03 RX ADMIN — FENTANYL CITRATE 25 MCG: 50 INJECTION INTRAMUSCULAR; INTRAVENOUS at 07:29

## 2024-06-03 RX ADMIN — FENTANYL CITRATE 25 MCG: 50 INJECTION INTRAMUSCULAR; INTRAVENOUS at 07:32

## 2024-06-03 RX ADMIN — ONDANSETRON 4 MG: 2 INJECTION INTRAMUSCULAR; INTRAVENOUS at 07:32

## 2024-06-03 ASSESSMENT — PAIN - FUNCTIONAL ASSESSMENT
PAIN_FUNCTIONAL_ASSESSMENT: 0-10
PAIN_FUNCTIONAL_ASSESSMENT: NONE - DENIES PAIN

## 2024-06-03 NOTE — H&P
Preoperative H&P Update    The patient's History and Physical in the medical record from 5/22/2024 was reviewed by me today.    History reviewed. No pertinent past medical history.  Past Surgical History:   Procedure Laterality Date    ANKLE FRACTURE SURGERY Right 02/14/2024    OPEN REDUCTION INTERNAL FIXATION RIGHT ANKLE FRACTURE performed by Alan Salinas MD at [facility], [place] OR    WISDOM TOOTH EXTRACTION       No current facility-administered medications on file prior to encounter.     Current Outpatient Medications on File Prior to Encounter   Medication Sig Dispense Refill    Norethin Ace-Eth Estrad-FE (KRISTOPHER 24 FE) 1-20 MG-MCG(24) TABS Take 1 tablet by mouth daily         Allergies   Allergen Reactions    Bactrim [Sulfamethoxazole-Trimethoprim] Hives      I reviewed the HPI, medications, allergies, reason for surgery, diagnosis and treatment plan and there has been no change.    The patient was evaluated by me today. Physical exam findings for this update include:    There were no vitals filed for this visit.  Airway is intact  Chest: chest clear, no wheezing, rales, normal symmetric air entry, no tachypnea, retractions or cyanosis  Heart: regular rate and rhythm ; heart sounds normal  Findings on exam of the body region where surgery is to be performed include:  Right ankle retained syndesmosis screw.    Electronically signed by Alan Salinas MD on 6/3/2024 at 6:33 AM

## 2024-06-03 NOTE — OP NOTE
[facility]          [address] [place] [address]                            OPERATIVE REPORT      PATIENT NAME: KRISTOPHER KLEIN                : 2000  MED REC NO: [id number]                      ROOM: OR  ACCOUNT NO: [id number]                       ADMIT DATE: 2024  PROVIDER: Alan Salinas MD      DATE OF PROCEDURE:  2024    SURGEON:  Alan aSlinas MD    ASSISTANT:  Yee Garcia CNP.    PREOPERATIVE DIAGNOSIS:  Right ankle retained deep implant/syndesmosis screw.    POSTOPERATIVE DIAGNOSIS:  Right ankle retained deep implant/syndesmosis screw.    PROCEDURE DONE:  Removal of deep implant, right ankle syndesmosis screw.    ANESTHESIA:  General anesthesia.    ESTIMATED BLOOD LOSS:  Minimal.    COMPLICATIONS:  None.    TOURNIQUET:  Right upper calf, 250 mmHg.    INDICATION:  This is a 23-year-old white female who sustained the right ankle trimalleolar fracture with syndesmosis disruption.  She had surgery on 2024.  She is scheduled for staged procedure for right ankle syndesmosis screw removal.  All risks, benefits, and alternatives were discussed with the patient.  She agreed to proceed with surgical removal.    DESCRIPTION OF PROCEDURE:  The right ankle was marked.  She received 2 g Ancef IV preoperatively.  The patient was then brought to the operating room, underwent general anesthesia.  A well-padded tourniquet was placed to the right upper calf.  The right lower extremity was then prepped and draped in regular sterile routine fashion.  A time-out was called confirming the patient's name, site of procedure.    Esmarch was used for exsanguination.  Tourniquet was inflated to 250 mmHg.  Incision was made over the screw area after we confirmed the site of the screw with the mini C-arm.  Careful dissection was performed.  We then identified the screw head and using the appropriate screwdriver, we were able to engage it and we were able

## 2024-06-03 NOTE — BRIEF OP NOTE
Brief Postoperative Note      Patient: Anne Gillette  YOB: 2000  MRN: [id number]    Date of Procedure: 6/3/2024    Pre-Op Diagnosis Codes:     * Ankle syndesmosis disruption, right, initial encounter [S93.431A]    Post-Op Diagnosis: Same       Procedure(s):  RIGHT ANKLE SYNDESMOSIS SCREW REMOVAL    Surgeon(s):  Alan Salinas MD    Assistant: VALERIE Garcia    Anesthesia: General    Estimated Blood Loss (mL): Minimal    Complications: None    Specimens:   * No specimens in log *    Implants:  * No implants in log *      Drains: * No LDAs found *    Findings:  Infection Present At Time Of Surgery (PATOS) (choose all levels that have infection present):  No infection present  Other Findings: Same.    Electronically signed by Alan Salinas MD on 6/3/2024 at 7:57 AM

## 2024-06-03 NOTE — PROGRESS NOTES
[facility] Pre-Admission Testing Electronic Communication Worksheet for OR/ENDO Procedures        Patient: Anne Gillette    Transportation Confirmed: [x] YES    []  NO    History and Physical:  [x] YES    []  NO  [] N/A  If yes, please list doctor or Urgent Care and date of H&P: Dr Salinas    Additional Clearance(Cardiac, Pulmonary, etc):  [] YES    []  NO    Pre-Admission Testing Visit:  [] YES    []  NO If no, do labs/testing need to be done DOS?  [] YES    []  NO    Medication Reconciliation Complete:  [x] YES    []  NO        Additional Notes:                Interview Complete: [x] YES    []  NO          Ludivina Dinh, RN  11:49 AM  
Oral airway removed.  Pt ROBLES to request.  Denies pain.   
Patient arrived from PACU alert and oriented, vss, no complaints of pain.     
Pt awake, denies pain.  To phase 2 care.   
To pacu from OR.  PT asleep with oral airway in place. Dressing to right ankle dry and intact. Toes to right foot warm with brisk cap refill.  IV infusing.  Monitor in sinus rhythm.   
Verbal and written discharge instructions were given to the patient and family, patient and family verbalize understanding of discharge instructions including medications orders for home and possible side effects associated with them. Patient instructed and verbalizes understanding to call the doctor listed if they should have any complications or worsening symptoms. Verbalizes understanding about follow-up appointments. D/C IV patient tolerated well, no signs of bleeding. Patient discharged home with all belongings. Out via wheelchair.   
surgery.    C-Difficile admission screening and protocol:       * Admitted with diarrhea?                         [] YES    [x]  NO     *Prior history of C-Diff. In last 3 months? [] YES    [x]  NO     *Antibiotic use in the past 6-8 weeks?      [x]  NO    []  YES                 If yes, which ANTIBIOTIC AND REASON______     *Prior hospitalization or nursing home in the last month? []  YES    [x]  NO        SAFETY FIRST..call before you fall

## 2024-06-03 NOTE — ANESTHESIA POSTPROCEDURE EVALUATION
[facility] Department of Anesthesiology  Post-Anesthesia Note       Name:  Anne Gillette                                  Age:  23 y.o.  MRN:  [id number]     Last Vitals & Oxygen Saturation: /76   Pulse 86   Temp 98.1 °F (36.7 °C)   Resp 16   Ht 1.549 m (5' 1\")   Wt 70.3 kg (155 lb)   LMP 04/24/2024 (Approximate)   SpO2 98%   BMI 29.29 kg/m²   Patient Vitals for the past 4 hrs:   BP Temp Temp src Pulse Resp SpO2 Height Weight   06/03/24 0820 118/76 -- -- 86 16 98 % -- --   06/03/24 0817 -- 98.1 °F (36.7 °C) -- 99 19 98 % -- --   06/03/24 0815 115/69 -- -- 80 18 98 % -- --   06/03/24 0805 108/67 -- -- 79 18 96 % -- --   06/03/24 0800 116/66 -- -- 91 19 96 % -- --   06/03/24 0755 126/65 -- -- (!) 104 20 98 % -- --   06/03/24 0750 (!) 107/54 -- -- 71 15 97 % -- --   06/03/24 0745 (!) 108/54 97.8 °F (36.6 °C) Temporal 79 15 97 % -- --   06/03/24 0639 132/84 98 °F (36.7 °C) Temporal 92 20 98 % 1.549 m (5' 1\") 70.3 kg (155 lb)       Level of consciousness:  Awake, alert    Respiratory: Respirations easy, no distress. Stable.    Cardiovascular: Hemodynamically stable.    Hydration: Adequate.    PONV: Adequately managed.    Post-op pain: Adequately controlled.    Post-op assessment: Tolerated anesthetic well without complication.    Complications:  None.    Jorge Frances MD  Shira 3, 2024   9:28 AM

## 2024-06-19 ENCOUNTER — OFFICE VISIT (OUTPATIENT)
Dept: ORTHOPEDIC SURGERY | Age: 24
End: 2024-06-19

## 2024-06-19 VITALS — BODY MASS INDEX: 29.27 KG/M2 | WEIGHT: 155 LBS | HEIGHT: 61 IN

## 2024-06-19 DIAGNOSIS — S93.431D SYNDESMOTIC DISRUPTION OF RIGHT ANKLE, SUBSEQUENT ENCOUNTER: Primary | ICD-10-CM

## 2024-06-19 DIAGNOSIS — S82.851D CLOSED TRIMALLEOLAR FRACTURE OF RIGHT ANKLE WITH ROUTINE HEALING, SUBSEQUENT ENCOUNTER: ICD-10-CM

## 2024-06-19 PROCEDURE — APPNB15 APP NON BILLABLE TIME 0-15 MINS: Performed by: NURSE PRACTITIONER

## 2024-06-19 PROCEDURE — 99024 POSTOP FOLLOW-UP VISIT: CPT | Performed by: NURSE PRACTITIONER

## 2024-06-20 NOTE — PROGRESS NOTES
DIAGNOSIS:  Right ankle hardware removal, syndesmosis screw.    DATE OF SURGERY:  6/3/2024.    HISTORY OF PRESENT ILLNESS:  Ms. Gillette 23 y.o.  female who came in today for 2 weeks postoperative visit.  The patient denies any significant pain in the right ankle. She has been WBAT.  No numbness or tingling sensation. No fever or Chills.     PHYSICAL EXAMINATION:  The incision healing well .  No signs of any erythema or drainage, minimal swelling. She has no pain with the active or passive range of motion of the right ankle, good ROM.  She has intact sensation distally, and she is neurovascularly intact.    IMAGING:  Three views right ankle taken today in the office showed hardware syndesmosis screw removal, no fracture. Remaining hardware intact.     IMPRESSION:  2 weeks out from right ankle syndesmosis screw removal and doing very well.    PLAN: She can go back to normal activity with no heavy impact activities for 6 weeks.  The patient will come back for a follow up in 3 months.  At that time, we will take 3 views of the right ankle.      Yee Garcia, FRANKI - CNP

## (undated) DEVICE — SUTURE VICRYL + SZ 3-0 L18IN ABSRB UD SH 1/2 CIR TAPERCUT NDL VCP864D

## (undated) DEVICE — HYPODERMIC SAFETY NEEDLE: Brand: MONOJECT

## (undated) DEVICE — SYRINGE MED 10ML LUERLOCK TIP W/O SFTY DISP

## (undated) DEVICE — MERCY HEALTH WEST TURNOVER: Brand: MEDLINE INDUSTRIES, INC.

## (undated) DEVICE — GLOVE SURG SZ 65 L12IN FNGR THK79MIL GRN LTX FREE

## (undated) DEVICE — STRIP,CLOSURE,WOUND,MEDI-STRIP,1/2X4: Brand: MEDLINE

## (undated) DEVICE — PADDING CAST W6INXL4YD COT LO LINTING WYTEX

## (undated) DEVICE — BIT DRL L100MM DIA2.5MM FOR SM FRAG UNIV LOK SYS

## (undated) DEVICE — SUTURE MCRYL + SZ 4-0 L18IN ABSRB UD L19MM PS-2 3/8 CIR MCP496G

## (undated) DEVICE — GLOVE SURG 9 PF CRM STRL SENSICARE PI MIC LF

## (undated) DEVICE — SHEET,DRAPE,53X77,STERILE: Brand: MEDLINE

## (undated) DEVICE — DECANTER BTL 6IN: Brand: MEDLINE INDUSTRIES, INC.

## (undated) DEVICE — SUTURE VCRL + SZ 2-0 L18IN ABSRB UD CT1 L36MM 1/2 CIR VCP839D

## (undated) DEVICE — DECANTER BAG 9": Brand: MEDLINE INDUSTRIES, INC.

## (undated) DEVICE — DRESSING,GAUZE,XEROFORM,CURAD,1"X8",ST: Brand: CURAD

## (undated) DEVICE — SUTURE VCRL + SZ 3-0 L18IN ABSRB UD SH 1/2 CIR TAPERCUT NDL VCP864D

## (undated) DEVICE — SPONGE GZ W4XL4IN COT 12 PLY TYP VII WVN C FLD DSGN STERILE

## (undated) DEVICE — ELECTRODE PT RET AD L9FT HI MOIST COND ADH HYDRGEL CORDED

## (undated) DEVICE — BIT DRL DIA2MM STD QUIK CONN FOR PERIARTC LOK PLT SYS

## (undated) DEVICE — C-ARM: Brand: UNBRANDED

## (undated) DEVICE — SYRINGE IRRIG 60ML SFT PLIABLE BLB EZ TO GRP 1 HND USE W/

## (undated) DEVICE — GOWN SIRUS NONREIN XL W/TWL: Brand: MEDLINE INDUSTRIES, INC.

## (undated) DEVICE — GLOVE SURG SZ 65 THK91MIL LTX FREE SYN POLYISOPRENE

## (undated) DEVICE — DRAPE,U/ SHT,SPLIT,PLAS,STERIL: Brand: MEDLINE

## (undated) DEVICE — BANDAGE COMPR W4INXL12FT E DISP ESMARCH EVEN

## (undated) DEVICE — TUBING, SUCTION, 1/4" X 12', STRAIGHT: Brand: MEDLINE

## (undated) DEVICE — PADDING,UNDERCAST,COTTON, 4"X4YD STERILE: Brand: MEDLINE

## (undated) DEVICE — 3M™ STERI-DRAPE™ U-DRAPE 1015: Brand: STERI-DRAPE™

## (undated) DEVICE — TOWEL,OR,DSP,ST,BLUE,STD,4/PK,20PK/CS: Brand: MEDLINE

## (undated) DEVICE — GUIDEWIRE ORTH L6IN DIA1.6MM PART THRD TIP FOR CANN SCR

## (undated) DEVICE — LOWER EXTREMITY: Brand: MEDLINE INDUSTRIES, INC.

## (undated) DEVICE — T-DRAPE,EXTREMITY,STERILE: Brand: MEDLINE

## (undated) DEVICE — MINOR SET UP: Brand: MEDLINE INDUSTRIES, INC.

## (undated) DEVICE — SUTURE MONOCRYL + SZ 4-0 L27IN ABSRB UD L19MM PS-2 3/8 CIR MCP426H

## (undated) DEVICE — C-ARM PACK: Brand: C-ARM COVER

## (undated) DEVICE — INTENDED FOR TISSUE SEPARATION, AND OTHER PROCEDURES THAT REQUIRE A SHARP SURGICAL BLADE TO PUNCTURE OR CUT.: Brand: BARD-PARKER ® STAINLESS STEEL BLADES

## (undated) DEVICE — MASTISOL ADHESIVE LIQ 2/3ML

## (undated) DEVICE — GLOVE SURG SZ 8 CRM LTX FREE POLYISOPRENE POLYMER BEAD ANTI

## (undated) DEVICE — NEEDLE HYPO 23GA L1.5IN TURQ POLYPR HUB S STL THN WALL IM

## (undated) DEVICE — BANDAGE COMPR W4INXL10YD WHITE/BEIGE E MTRX HK LOOP CLSR

## (undated) DEVICE — BANDAGE COMPR W6INXL12FT SMOOTH FOR LIMB EXSANG ESMARCH

## (undated) DEVICE — BANDAGE COMPR W4INXL15FT BGE E SGL LAYERED CLP CLSR